# Patient Record
Sex: FEMALE | Race: WHITE | NOT HISPANIC OR LATINO | Employment: OTHER | RURAL
[De-identification: names, ages, dates, MRNs, and addresses within clinical notes are randomized per-mention and may not be internally consistent; named-entity substitution may affect disease eponyms.]

---

## 2021-09-02 ENCOUNTER — OFFICE VISIT (OUTPATIENT)
Dept: OBSTETRICS AND GYNECOLOGY | Facility: CLINIC | Age: 61
End: 2021-09-02
Payer: COMMERCIAL

## 2021-09-02 VITALS
SYSTOLIC BLOOD PRESSURE: 145 MMHG | BODY MASS INDEX: 27.67 KG/M2 | WEIGHT: 172.19 LBS | HEIGHT: 66 IN | DIASTOLIC BLOOD PRESSURE: 78 MMHG

## 2021-09-02 DIAGNOSIS — N39.3 STRESS INCONTINENCE OF URINE: ICD-10-CM

## 2021-09-02 DIAGNOSIS — Z12.4 SCREENING FOR MALIGNANT NEOPLASM OF THE CERVIX: ICD-10-CM

## 2021-09-02 DIAGNOSIS — Z01.419 WOMEN'S ANNUAL ROUTINE GYNECOLOGICAL EXAMINATION: Primary | ICD-10-CM

## 2021-09-02 PROCEDURE — 99203 OFFICE O/P NEW LOW 30 MIN: CPT | Mod: PBBFAC | Performed by: OBSTETRICS & GYNECOLOGY

## 2021-09-02 PROCEDURE — 87086 URINE CULTURE/COLONY COUNT: CPT | Mod: ,,, | Performed by: CLINICAL MEDICAL LABORATORY

## 2021-09-02 PROCEDURE — 99386 PR PREVENTIVE VISIT,NEW,40-64: ICD-10-PCS | Mod: S$PBB,,, | Performed by: OBSTETRICS & GYNECOLOGY

## 2021-09-02 PROCEDURE — 88142 CYTOPATH C/V THIN LAYER: CPT | Mod: GCY | Performed by: OBSTETRICS & GYNECOLOGY

## 2021-09-02 PROCEDURE — 87086 CULTURE, URINE: ICD-10-PCS | Mod: ,,, | Performed by: CLINICAL MEDICAL LABORATORY

## 2021-09-02 PROCEDURE — 87077 CULTURE, URINE: ICD-10-PCS | Mod: ,,, | Performed by: CLINICAL MEDICAL LABORATORY

## 2021-09-02 PROCEDURE — 87077 CULTURE AEROBIC IDENTIFY: CPT | Mod: ,,, | Performed by: CLINICAL MEDICAL LABORATORY

## 2021-09-02 PROCEDURE — 99386 PREV VISIT NEW AGE 40-64: CPT | Mod: S$PBB,,, | Performed by: OBSTETRICS & GYNECOLOGY

## 2021-09-02 RX ORDER — VALACYCLOVIR HYDROCHLORIDE 1 G/1
1000 TABLET, FILM COATED ORAL DAILY
COMMUNITY
Start: 2021-08-27

## 2021-09-04 LAB — UA COMPLETE W REFLEX CULTURE PNL UR: ABNORMAL

## 2021-09-09 LAB
GH SERPL-MCNC: NORMAL NG/ML
INSULIN SERPL-ACNC: NORMAL U[IU]/ML
LAB AP CLINICAL INFORMATION: NORMAL
LAB AP GYN INTERPRETATION: NEGATIVE
LAB AP PAP DISCLAIMER COMMENTS: NORMAL
RENIN PLAS-CCNC: NORMAL NG/ML/H

## 2023-09-02 ENCOUNTER — OFFICE VISIT (OUTPATIENT)
Dept: FAMILY MEDICINE | Facility: CLINIC | Age: 63
End: 2023-09-02
Payer: COMMERCIAL

## 2023-09-02 VITALS
BODY MASS INDEX: 27.64 KG/M2 | RESPIRATION RATE: 20 BRPM | DIASTOLIC BLOOD PRESSURE: 76 MMHG | WEIGHT: 172 LBS | TEMPERATURE: 99 F | HEIGHT: 66 IN | HEART RATE: 56 BPM | OXYGEN SATURATION: 97 % | SYSTOLIC BLOOD PRESSURE: 128 MMHG
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
## 2023-09-02 DIAGNOSIS — S81.851A CAT BITE OF RIGHT LOWER LEG, INITIAL ENCOUNTER: Primary | ICD-10-CM

## 2023-09-02 PROCEDURE — 99203 OFFICE O/P NEW LOW 30 MIN: CPT | Mod: ,,, | Performed by: FAMILY MEDICINE

## 2023-09-02 PROCEDURE — 99051 MED SERV EVE/WKEND/HOLIDAY: CPT | Mod: ,,, | Performed by: FAMILY MEDICINE

## 2023-09-02 PROCEDURE — 99203 PR OFFICE/OUTPT VISIT, NEW, LEVL III, 30-44 MIN: ICD-10-PCS | Mod: ,,, | Performed by: FAMILY MEDICINE

## 2023-09-02 PROCEDURE — 99051 PR MEDICAL SERVICES, EVE/WKEND/HOLIDAY: ICD-10-PCS | Mod: ,,, | Performed by: FAMILY MEDICINE

## 2023-09-02 RX ORDER — AMOXICILLIN AND CLAVULANATE POTASSIUM 875; 125 MG/1; MG/1
1 TABLET, FILM COATED ORAL EVERY 12 HOURS
Qty: 20 TABLET | Refills: 0 | Status: SHIPPED | OUTPATIENT
Start: 2023-09-02 | End: 2023-09-30

## 2023-09-02 NOTE — PROGRESS NOTES
Subjective     Patient ID: Dorie Rhoades is a 62 y.o. female.    Chief Complaint: Leg Swelling (Right lower leg is swelling from cat scratching yesterday)    Patient had temp up to 100° last night.    Review of Systems       Objective     Physical Exam  Constitutional:       General: She is in acute distress (she appears uncomfortable).      Appearance: She is not ill-appearing.   Cardiovascular:      Rate and Rhythm: Normal rate and regular rhythm.   Pulmonary:      Effort: Pulmonary effort is normal.      Breath sounds: Normal breath sounds.   Skin:     Findings: Erythema (patient has a wound in right lower leg more consistent with a bite than a scratch.  She has very mild diffuse erythema and edema in the area.  Right lower leg just proximal to ankle) and rash present.   Neurological:      Mental Status: She is alert.          Assessment and Plan     1. Cat bite of right lower leg, initial encounter    Other orders  -     amoxicillin-clavulanate 875-125mg (AUGMENTIN) 875-125 mg per tablet; Take 1 tablet by mouth every 12 (twelve) hours.  Dispense: 20 tablet; Refill: 0        Call for worsening swelling redness or fever         No follow-ups on file.

## 2023-09-29 ENCOUNTER — OFFICE VISIT (OUTPATIENT)
Dept: FAMILY MEDICINE | Facility: CLINIC | Age: 63
End: 2023-09-29
Payer: COMMERCIAL

## 2023-09-29 VITALS
HEIGHT: 66 IN | WEIGHT: 167 LBS | DIASTOLIC BLOOD PRESSURE: 80 MMHG | SYSTOLIC BLOOD PRESSURE: 148 MMHG | OXYGEN SATURATION: 92 % | BODY MASS INDEX: 26.84 KG/M2 | RESPIRATION RATE: 20 BRPM | HEART RATE: 77 BPM

## 2023-09-29 DIAGNOSIS — R94.31 EKG ABNORMALITY: Primary | ICD-10-CM

## 2023-09-29 DIAGNOSIS — D89.2 HYPERGAMMAGLOBULINEMIA: ICD-10-CM

## 2023-09-29 LAB
ALBUMIN SERPL BCP-MCNC: 3.8 G/DL (ref 3.5–5)
ALBUMIN/GLOB SERPL: 1.1 {RATIO}
ALP SERPL-CCNC: 104 U/L (ref 50–130)
ALT SERPL W P-5'-P-CCNC: 39 U/L (ref 13–56)
ANION GAP SERPL CALCULATED.3IONS-SCNC: 11 MMOL/L (ref 7–16)
AST SERPL W P-5'-P-CCNC: 20 U/L (ref 15–37)
BILIRUB SERPL-MCNC: 0.4 MG/DL (ref ?–1.2)
BUN SERPL-MCNC: 22 MG/DL (ref 7–18)
BUN/CREAT SERPL: 25 (ref 6–20)
CALCIUM SERPL-MCNC: 9.2 MG/DL (ref 8.5–10.1)
CHLORIDE SERPL-SCNC: 104 MMOL/L (ref 98–107)
CO2 SERPL-SCNC: 32 MMOL/L (ref 21–32)
CREAT SERPL-MCNC: 0.87 MG/DL (ref 0.55–1.02)
EGFR (NO RACE VARIABLE) (RUSH/TITUS): 75 ML/MIN/1.73M2
GLOBULIN SER-MCNC: 3.4 G/DL (ref 2–4)
GLUCOSE SERPL-MCNC: 100 MG/DL (ref 74–106)
POTASSIUM SERPL-SCNC: 4.7 MMOL/L (ref 3.5–5.1)
PROT SERPL-MCNC: 7.2 G/DL (ref 6.4–8.2)
SODIUM SERPL-SCNC: 142 MMOL/L (ref 136–145)

## 2023-09-29 PROCEDURE — 84165 PROTEIN ELECTROPHORESIS, SERUM WITH REFLEX IFE: ICD-10-PCS | Mod: 26,,, | Performed by: PATHOLOGY

## 2023-09-29 PROCEDURE — 99213 OFFICE O/P EST LOW 20 MIN: CPT | Mod: ,,, | Performed by: FAMILY MEDICINE

## 2023-09-29 PROCEDURE — 84165 PROTEIN ELECTROPHORESIS, SERUM WITH REFLEX IFE: ICD-10-PCS | Mod: ,,, | Performed by: CLINICAL MEDICAL LABORATORY

## 2023-09-29 PROCEDURE — 84165 PROTEIN E-PHORESIS SERUM: CPT | Mod: ,,, | Performed by: CLINICAL MEDICAL LABORATORY

## 2023-09-29 PROCEDURE — 80053 COMPREHENSIVE METABOLIC PANEL: ICD-10-PCS | Mod: ,,, | Performed by: CLINICAL MEDICAL LABORATORY

## 2023-09-29 PROCEDURE — 84165 PROTEIN E-PHORESIS SERUM: CPT | Mod: 26,,, | Performed by: PATHOLOGY

## 2023-09-29 PROCEDURE — 99213 PR OFFICE/OUTPT VISIT, EST, LEVL III, 20-29 MIN: ICD-10-PCS | Mod: ,,, | Performed by: FAMILY MEDICINE

## 2023-09-29 PROCEDURE — 80053 COMPREHEN METABOLIC PANEL: CPT | Mod: ,,, | Performed by: CLINICAL MEDICAL LABORATORY

## 2023-09-29 RX ORDER — AMOXICILLIN AND CLAVULANATE POTASSIUM 875; 125 MG/1; MG/1
1 TABLET, FILM COATED ORAL EVERY 12 HOURS
Qty: 20 TABLET | Refills: 0 | Status: SHIPPED | OUTPATIENT
Start: 2023-09-29 | End: 2023-11-09

## 2023-09-29 RX ORDER — FLUTICASONE PROPIONATE 50 MCG
2 SPRAY, SUSPENSION (ML) NASAL 2 TIMES DAILY
Qty: 18.2 ML | Refills: 0 | Status: SHIPPED | OUTPATIENT
Start: 2023-09-29

## 2023-09-30 NOTE — PROGRESS NOTES
Grady Feliz MD        PATIENT NAME: Dorie Rhoades  : 1960  DATE: 23  MRN: [id number]      Billing Provider: Grady Feliz MD  Level of Service: [place] OFFICE/OUTPT VISIT, EST, LEVL III, 20-29 MIN  Patient PCP Information       Provider PCP Type    Grady Feliz MD General            Reason for Visit / Chief Complaint: Sinus Problem       Update PCP  Update Chief Complaint         History of Present Illness / Problem Focused Workflow     Dorie Rhoades presents to the clinic with Sinus Problem     Patient recently had a physical exam and lab with the ekg to upgrade her life insurance.  She was denied and has come for clarification.  The issues were hyper gammaglobulin  and an  abnormal EKG.  sHe is totally asymptomatic    Sinus Problem  Pertinent negatives include no congestion, coughing, headaches, neck pain, sinus pressure or sore throat.       Review of Systems     Review of Systems   Constitutional:  Negative for activity change, appetite change, fever and unexpected weight change.   HENT:  Negative for congestion, rhinorrhea, sinus pressure, sinus pain, sore throat and trouble swallowing.    Eyes:  Negative for photophobia, pain, discharge and visual disturbance.   Respiratory:  Negative for cough, chest tightness, wheezing and stridor.    Cardiovascular:  Negative for chest pain, palpitations and leg swelling.   Gastrointestinal:  Negative for abdominal pain, blood in stool, constipation, diarrhea and nausea.   Endocrine: Negative for polydipsia, polyphagia and polyuria.   Genitourinary:  Negative for difficulty urinating, flank pain and hematuria.   Musculoskeletal:  Negative for arthralgias and neck pain.   Skin:  Negative for rash.   Allergic/Immunologic: Negative for food allergies.   Neurological:  Negative for dizziness, tremors, seizures, syncope, weakness (global weakness) and headaches.   Psychiatric/Behavioral:  Negative for behavioral problems, confusion, decreased  concentration, dysphoric mood and hallucinations. The patient is not nervous/anxious.         Medical / Social / Family History   History reviewed. No pertinent past medical history.    Past Surgical History:   Procedure Laterality Date     SECTION      UTERINE FIBROID SURGERY         Social History  Ms.  reports that she has never smoked. She has never been exposed to tobacco smoke. She has never used smokeless tobacco. She reports that she does not currently use alcohol. She reports that she does not use drugs.    Family History  Ms.'s family history is not on file.    Medications and Allergies     Medications  No outpatient medications have been marked as taking for the 23 encounter (Office Visit) with Grady Feliz MD.       Allergies  Review of patient's allergies indicates:  No Known Allergies    Physical Examination     Vitals:    23 1157   BP: (!) 148/80   Pulse:    Resp:      Physical Exam  Constitutional:       General: She is not in acute distress.     Appearance: Normal appearance.   HENT:      Head: Normocephalic.      Right Ear: Tympanic membrane and ear canal normal.      Left Ear: Tympanic membrane and ear canal normal.      Nose: Nose normal.      Mouth/Throat:      Mouth: Mucous membranes are moist.      Pharynx: No oropharyngeal exudate.   Eyes:      Extraocular Movements: Extraocular movements intact.      Pupils: Pupils are equal, round, and reactive to light.   Cardiovascular:      Rate and Rhythm: Normal rate and regular rhythm.      Heart sounds: No murmur heard.  Pulmonary:      Effort: Pulmonary effort is normal.      Breath sounds: Normal breath sounds. No wheezing.   Abdominal:      General: Abdomen is flat. Bowel sounds are normal.      Palpations: Abdomen is soft.      Hernia: No hernia is present.   Musculoskeletal:         General: Normal range of motion.      Cervical back: Normal range of motion and neck supple.      Right lower leg: No edema.      Left lower  leg: No edema.   Lymphadenopathy:      Cervical: No cervical adenopathy.   Skin:     General: Skin is warm and dry.      Coloration: Skin is not jaundiced.      Findings: No lesion.   Neurological:      General: No focal deficit present.      Mental Status: She is alert and oriented to person, place, and time.      Cranial Nerves: No cranial nerve deficit.      Gait: Gait normal.   Psychiatric:         Mood and Affect: Mood normal.         Behavior: Behavior normal.         Judgment: Judgment normal.          Assessment and Plan (including Health Maintenance)      Problem List  Smart Sets  Document Outside HM   :    Plan:     1. EKG abnormality    -     Ambulatory referral/consult to Cardiology; Future; Expected date: 10/06/2023    2. Hypergammaglobulinemia    -     Comprehensive Metabolic Panel; Future; Expected date: 03/29/2024  -     Protein Electrophoresis, Serum with Reflex LUCRECIA; Future; Expected date: 09/29/2023    Other orders  -     amoxicillin-clavulanate 875-125mg (AUGMENTIN) 875-125 mg per tablet; Take 1 tablet by mouth every 12 (twelve) hours.  Dispense: 20 tablet; Refill: 0  -     fluticasone propionate (FLONASE) 50 mcg/actuation nasal spray; 2 sprays (100 mcg total) by Each Nostril route 2 (two) times a day.  Dispense: 18.2 mL; Refill: 0          Health Maintenance Due   Topic Date Due    Hepatitis C Screening  Never done    Lipid Panel  Never done    COVID-19 Vaccine (1) Never done    HIV Screening  Never done    TETANUS VACCINE  Never done    Mammogram  Never done    Hemoglobin A1c (Diabetic Prevention Screening)  Never done    Colorectal Cancer Screening  Never done    Shingles Vaccine (1 of 2) Never done    Influenza Vaccine (1) Never done       Problem List Items Addressed This Visit    None  Visit Diagnoses       EKG abnormality    -  Primary    Relevant Orders    Ambulatory referral/consult to Cardiology    Hypergammaglobulinemia        Relevant Orders    Comprehensive Metabolic Panel (Completed)     Protein Electrophoresis, Serum with Reflex LUCRECIA            Health Maintenance Topics with due status: Not Due       Topic Last Completion Date    Cervical Cancer Screening 09/02/2021     She does have a bundle-branch block on EKG.  Will refer to Cardiology.  Other labs pending I will contact her with the results.  No future appointments.     There are no Patient Instructions on file for this visit.  Follow up if symptoms worsen or fail to improve.     Signature:  Grady Feliz MD      Date of encounter: 9/29/23

## 2023-10-03 LAB
ALBUMIN PE, BLOOD: 4.63 G/DL (ref 3.5–5.2)
ALPHA1 GLOB SERPL ELPH-MCNC: 0.2 G/DL (ref 0.1–0.4)
ALPHA2 GLOB SERPL ELPH-MCNC: 0.7 G/DL (ref 0.4–1.3)
B-GLOBULIN SERPL ELPH-MCNC: 0.7 G/DL (ref 0.5–1.5)
GAMMA GLOB SERPL ELPH-MCNC: 0.7 G/DL (ref 0.5–1.8)
PATH INTERP BLD-IMP: NORMAL
PROT SERPL-MCNC: 6.9 G/DL (ref 6.4–8.2)

## 2023-11-08 NOTE — PROGRESS NOTES
"PCP: Grady Feliz MD    Referring Provider:     Subjective:   Dorie Rhoades is a 62 y.o. female with of cardiovascular disease who presents for a new patient visit.    Referred for abnormal EKG. She underwent EKG for insurance evaluation. EKG 7/12/23 reviewed- NSR with nonspecific ST abnormality. Due to above abnormality, she is referred to cardiology for further evaluation today.  EKG today with possible LVH and nonspecific ST abnormality.   Denies chest pain/tightness, dyspnea, palpitations, lightheadedness or syncope.     Fhx:  Diabetes (mother, father, sister)  Shx:  Never smoker    EKG 11/09/2023:  Normal sinus rhythm, LVH by voltage criteria, nonspecific ST abnormality    ECHO No results found for this or any previous visit.     CATH: No results found for this or any previous visit.     Lab Results   Component Value Date     09/29/2023    K 4.7 09/29/2023     09/29/2023    CO2 32 09/29/2023    BUN 22 (H) 09/29/2023    CREATININE 0.87 09/29/2023    CALCIUM 9.2 09/29/2023    ANIONGAP 11 09/29/2023       No results found for: "CHOL"  No results found for: "HDL"  No results found for: "LDLCALC"  No results found for: "TRIG"  No results found for: "CHOLHDL"    No results found for: "WBC", "HGB", "HCT", "MCV", "PLT"        Current Outpatient Medications:     aspirin 325 MG tablet, Take 325 mg by mouth once daily., Disp: , Rfl:     fluticasone propionate (FLONASE) 50 mcg/actuation nasal spray, 2 sprays (100 mcg total) by Each Nostril route 2 (two) times a day., Disp: 18.2 mL, Rfl: 0    valACYclovir (VALTREX) 1000 MG tablet, Take 1,000 mg by mouth once daily., Disp: , Rfl:     amoxicillin-clavulanate 875-125mg (AUGMENTIN) 875-125 mg per tablet, Take 1 tablet by mouth every 12 (twelve) hours. (Patient not taking: Reported on 11/9/2023), Disp: 20 tablet, Rfl: 0    losartan (COZAAR) 25 MG tablet, Take 1 tablet (25 mg total) by mouth once daily., Disp: 90 tablet, Rfl: 3    Review of Systems " "  Constitutional:  Negative for chills, diaphoresis, fever and malaise/fatigue.   Respiratory:  Negative for cough and shortness of breath.    Cardiovascular:  Negative for chest pain, palpitations, orthopnea, claudication, leg swelling and PND.   Gastrointestinal:  Negative for abdominal pain, heartburn, nausea and vomiting.   Neurological:  Negative for dizziness.       Objective:   /88 (BP Location: Right arm, Patient Position: Sitting)   Pulse 73   Ht 5' 6" (1.676 m)   Wt 75.8 kg (167 lb)   SpO2 96%   BMI 26.95 kg/m²     Physical Exam  Constitutional:       General: She is not in acute distress.     Appearance: Normal appearance.   Cardiovascular:      Rate and Rhythm: Normal rate and regular rhythm.      Pulses: Normal pulses.      Heart sounds: Normal heart sounds. No murmur heard.     No friction rub. No gallop.   Pulmonary:      Effort: Pulmonary effort is normal.      Breath sounds: Normal breath sounds. No wheezing or rales.   Musculoskeletal:      Right lower leg: No edema.      Left lower leg: No edema.   Skin:     General: Skin is warm and dry.   Neurological:      Mental Status: She is alert.           Assessment:     1. Nonspecific abnormal electrocardiogram (ECG) (EKG)  EKG 12-lead    EKG 12-lead      2. EKG abnormality  Ambulatory referral/consult to Cardiology            Plan:   No problem-specific Assessment & Plan notes found for this encounter.    Hypertension  Blood pressure 150/90 on initial presentation, but 130/88 on recheck  Based on review of prior blood pressure readings, 4 out of 5 BP readings consistent with hypertension  EKG with possible LVH    Plan:  Start losartan 25 mg daily    Nonspecific ST abnormality on EKG   Pt is asymptomatic   Denies anginal or anginal equivalent symptoms and is reasonably active at baseline.  Walks 1-1.5 miles per day without any significant complaints.  No further cardiac testing for now    Follow-up in 6 months  "

## 2023-11-09 ENCOUNTER — OFFICE VISIT (OUTPATIENT)
Dept: CARDIOLOGY | Facility: CLINIC | Age: 63
End: 2023-11-09
Payer: COMMERCIAL

## 2023-11-09 VITALS
BODY MASS INDEX: 26.84 KG/M2 | WEIGHT: 167 LBS | DIASTOLIC BLOOD PRESSURE: 88 MMHG | HEART RATE: 73 BPM | SYSTOLIC BLOOD PRESSURE: 130 MMHG | HEIGHT: 66 IN | OXYGEN SATURATION: 96 %

## 2023-11-09 DIAGNOSIS — R94.31 NONSPECIFIC ABNORMAL ELECTROCARDIOGRAM (ECG) (EKG): Primary | ICD-10-CM

## 2023-11-09 DIAGNOSIS — R94.31 EKG ABNORMALITY: ICD-10-CM

## 2023-11-09 PROCEDURE — 93010 EKG 12-LEAD: ICD-10-PCS | Mod: S$PBB,,, | Performed by: STUDENT IN AN ORGANIZED HEALTH CARE EDUCATION/TRAINING PROGRAM

## 2023-11-09 PROCEDURE — 99214 OFFICE O/P EST MOD 30 MIN: CPT | Mod: PBBFAC | Performed by: STUDENT IN AN ORGANIZED HEALTH CARE EDUCATION/TRAINING PROGRAM

## 2023-11-09 PROCEDURE — 99204 PR OFFICE/OUTPT VISIT, NEW, LEVL IV, 45-59 MIN: ICD-10-PCS | Mod: S$PBB,,, | Performed by: STUDENT IN AN ORGANIZED HEALTH CARE EDUCATION/TRAINING PROGRAM

## 2023-11-09 PROCEDURE — 99204 OFFICE O/P NEW MOD 45 MIN: CPT | Mod: S$PBB,,, | Performed by: STUDENT IN AN ORGANIZED HEALTH CARE EDUCATION/TRAINING PROGRAM

## 2023-11-09 PROCEDURE — 93005 ELECTROCARDIOGRAM TRACING: CPT | Mod: PBBFAC | Performed by: STUDENT IN AN ORGANIZED HEALTH CARE EDUCATION/TRAINING PROGRAM

## 2023-11-09 PROCEDURE — 93010 ELECTROCARDIOGRAM REPORT: CPT | Mod: S$PBB,,, | Performed by: STUDENT IN AN ORGANIZED HEALTH CARE EDUCATION/TRAINING PROGRAM

## 2023-11-09 RX ORDER — LOSARTAN POTASSIUM 25 MG/1
25 TABLET ORAL DAILY
Qty: 90 TABLET | Refills: 3 | Status: SHIPPED | OUTPATIENT
Start: 2023-11-09 | End: 2024-11-08

## 2023-11-09 RX ORDER — ASPIRIN 325 MG
325 TABLET ORAL DAILY
COMMUNITY

## 2024-05-12 NOTE — PROGRESS NOTES
"PCP: Grady Feliz MD    Referring Provider:     Subjective:   Dorie Rhoades is a 63 y.o. female with of cardiovascular disease who presents for a follow up visit.    5/13/24:  Doing well.  Complains of occasional shortness of breath when she is out in the heat.  She continues to walk and dance without any major complaints.  Chest pain, palpitations lightheadedness or syncope.    11/08/23:  Presented for new patient visit.  Referred for abnormal EKG. She underwent EKG for insurance evaluation. EKG 7/12/23 reviewed- NSR with nonspecific ST abnormality. Due to above abnormality, she is referred to cardiology for further evaluation today.  EKG today with possible LVH and nonspecific ST abnormality.   Denies chest pain/tightness, dyspnea, palpitations, lightheadedness or syncope.     Fhx:  Diabetes (mother, father, sister)  Shx:  Never smoker    EKG 05/14/2024: Normal sinus rhythm, anterior infarct age undetermined  11/09/2023:  Normal sinus rhythm, LVH by voltage criteria, nonspecific ST abnormality    ECHO No results found for this or any previous visit.     CATH: No results found for this or any previous visit.     Lab Results   Component Value Date     09/29/2023    K 4.7 09/29/2023     09/29/2023    CO2 32 09/29/2023    BUN 22 (H) 09/29/2023    CREATININE 0.87 09/29/2023    CALCIUM 9.2 09/29/2023    ANIONGAP 11 09/29/2023       No results found for: "CHOL"  No results found for: "HDL"  No results found for: "LDLCALC"  No results found for: "TRIG"  No results found for: "CHOLHDL"    No results found for: "WBC", "HGB", "HCT", "MCV", "PLT"        Current Outpatient Medications:     aspirin 325 MG tablet, Take 325 mg by mouth once daily., Disp: , Rfl:     cetirizine (ZYRTEC) 10 MG tablet, Take 10 mg by mouth once daily., Disp: , Rfl:     fluticasone propionate (FLONASE) 50 mcg/actuation nasal spray, 2 sprays (100 mcg total) by Each Nostril route 2 (two) times a day., Disp: 18.2 mL, Rfl: 0    folic acid " "(FOLVITE) 1 MG tablet, Take 1 mg by mouth once daily., Disp: , Rfl:     losartan (COZAAR) 25 MG tablet, Take 1 tablet (25 mg total) by mouth once daily., Disp: 90 tablet, Rfl: 3    multivit,stress formula-zinc (STRESS FORMULA WITH ZINC) Tab, Take by mouth., Disp: , Rfl:     omega-3 fatty acids/fish oil (FISH OIL-OMEGA-3 FATTY ACIDS) 300-1,000 mg capsule, Take 1 capsule by mouth once daily., Disp: , Rfl:     pyridoxine, vitamin B6, (VITAMIN B-6) 100 MG Tab, Take 50 mg by mouth once daily., Disp: , Rfl:     valACYclovir (VALTREX) 1000 MG tablet, Take 1,000 mg by mouth once daily., Disp: , Rfl:     vitamin E 400 UNIT capsule, Take 400 Units by mouth once daily., Disp: , Rfl:     zinc gluconate 50 mg tablet, Take 50 mg by mouth once daily., Disp: , Rfl:     Review of Systems   Constitutional:  Negative for chills, diaphoresis, fever and malaise/fatigue.   Respiratory:  Negative for cough and shortness of breath.    Cardiovascular:  Negative for chest pain, palpitations, orthopnea, claudication, leg swelling and PND.   Gastrointestinal:  Negative for abdominal pain, heartburn, nausea and vomiting.   Neurological:  Negative for dizziness.       Objective:   /60 (BP Location: Left arm, Patient Position: Sitting)   Pulse 76   Ht 5' 6" (1.676 m)   Wt 76.9 kg (169 lb 9.6 oz)   SpO2 96%   BMI 27.37 kg/m²     Physical Exam  Constitutional:       General: She is not in acute distress.     Appearance: Normal appearance.   Cardiovascular:      Rate and Rhythm: Normal rate and regular rhythm.      Pulses: Normal pulses.      Heart sounds: Normal heart sounds. No murmur heard.     No friction rub. No gallop.   Pulmonary:      Effort: Pulmonary effort is normal.      Breath sounds: Normal breath sounds. No wheezing or rales.   Musculoskeletal:      Right lower leg: No edema.      Left lower leg: No edema.   Skin:     General: Skin is warm and dry.   Neurological:      Mental Status: She is alert.           Assessment: "     1. Hypertension, unspecified type  EKG 12-lead            Plan:   No problem-specific Assessment & Plan notes found for this encounter.    Hypertension  11/2023:Blood pressure 150/90 on initial presentation, but 130/88 on recheck  Based on review of prior blood pressure readings, 4 out of 5 BP readings consistent with hypertension. EKG with possible LVH  BP is at goal today    Plan:  Continue losartan 25 mg daily    Abnormal EKG   Nonspecific ST abnormality  Age indeterminate anterior infarct  Denies angina and is reasonably active at baseline.  Walks 1-1.5 miles per day without any significant complaints.  No further cardiac testing for now  Endorses shortness of breath when out in the heat.  If this persists, schedule echocardiogram    Follow-up in 6 months

## 2024-05-13 ENCOUNTER — OFFICE VISIT (OUTPATIENT)
Dept: CARDIOLOGY | Facility: CLINIC | Age: 64
End: 2024-05-13
Payer: COMMERCIAL

## 2024-05-13 VITALS
BODY MASS INDEX: 27.26 KG/M2 | OXYGEN SATURATION: 96 % | SYSTOLIC BLOOD PRESSURE: 130 MMHG | HEART RATE: 76 BPM | DIASTOLIC BLOOD PRESSURE: 60 MMHG | HEIGHT: 66 IN | WEIGHT: 169.63 LBS

## 2024-05-13 DIAGNOSIS — R06.09 DYSPNEA ON EXERTION: Primary | ICD-10-CM

## 2024-05-13 DIAGNOSIS — I10 HYPERTENSION, UNSPECIFIED TYPE: ICD-10-CM

## 2024-05-13 DIAGNOSIS — R94.31 NONSPECIFIC ABNORMAL ELECTROCARDIOGRAM (ECG) (EKG): ICD-10-CM

## 2024-05-13 LAB
OHS QRS DURATION: 86 MS
OHS QTC CALCULATION: 425 MS

## 2024-05-13 PROCEDURE — 93010 ELECTROCARDIOGRAM REPORT: CPT | Mod: S$PBB,,, | Performed by: STUDENT IN AN ORGANIZED HEALTH CARE EDUCATION/TRAINING PROGRAM

## 2024-05-13 PROCEDURE — 99213 OFFICE O/P EST LOW 20 MIN: CPT | Mod: S$PBB,,, | Performed by: STUDENT IN AN ORGANIZED HEALTH CARE EDUCATION/TRAINING PROGRAM

## 2024-05-13 PROCEDURE — 99214 OFFICE O/P EST MOD 30 MIN: CPT | Mod: PBBFAC,25 | Performed by: STUDENT IN AN ORGANIZED HEALTH CARE EDUCATION/TRAINING PROGRAM

## 2024-05-13 PROCEDURE — 93005 ELECTROCARDIOGRAM TRACING: CPT | Mod: PBBFAC | Performed by: STUDENT IN AN ORGANIZED HEALTH CARE EDUCATION/TRAINING PROGRAM

## 2024-05-13 RX ORDER — CETIRIZINE HYDROCHLORIDE 10 MG/1
10 TABLET ORAL DAILY
COMMUNITY

## 2024-05-13 RX ORDER — FOLIC ACID 1 MG/1
1 TABLET ORAL DAILY
COMMUNITY

## 2024-05-13 RX ORDER — PYRIDOXINE HCL (VITAMIN B6) 100 MG
50 TABLET ORAL DAILY
COMMUNITY

## 2024-05-13 RX ORDER — AMOXICILLIN 500 MG
1 CAPSULE ORAL DAILY
COMMUNITY

## 2024-05-13 RX ORDER — MULTIVIT,STRESS FORMULA/ZINC
TABLET ORAL
COMMUNITY

## 2024-05-13 RX ORDER — ZINC GLUCONATE 50 MG
50 TABLET ORAL DAILY
COMMUNITY

## 2024-05-13 RX ORDER — VITAMIN E 268 MG
400 CAPSULE ORAL DAILY
COMMUNITY

## 2024-07-30 ENCOUNTER — OFFICE VISIT (OUTPATIENT)
Dept: FAMILY MEDICINE | Facility: CLINIC | Age: 64
End: 2024-07-30
Payer: COMMERCIAL

## 2024-07-30 VITALS
DIASTOLIC BLOOD PRESSURE: 80 MMHG | WEIGHT: 172 LBS | HEIGHT: 66 IN | SYSTOLIC BLOOD PRESSURE: 131 MMHG | RESPIRATION RATE: 20 BRPM | HEART RATE: 70 BPM | BODY MASS INDEX: 27.64 KG/M2 | OXYGEN SATURATION: 95 %

## 2024-07-30 DIAGNOSIS — L70.9 ACNE, UNSPECIFIED ACNE TYPE: ICD-10-CM

## 2024-07-30 DIAGNOSIS — D22.9 ATYPICAL NEVUS: Primary | ICD-10-CM

## 2024-07-30 PROCEDURE — 99212 OFFICE O/P EST SF 10 MIN: CPT | Mod: ,,, | Performed by: FAMILY MEDICINE

## 2024-07-30 RX ORDER — TRETINOIN 0.5 MG/G
CREAM TOPICAL NIGHTLY
Qty: 20 G | Refills: 5 | Status: SHIPPED | OUTPATIENT
Start: 2024-07-30

## 2024-07-30 RX ORDER — MUPIROCIN 20 MG/G
OINTMENT TOPICAL 3 TIMES DAILY
Qty: 15 G | Refills: 5 | Status: SHIPPED | OUTPATIENT
Start: 2024-07-30

## 2024-07-30 NOTE — PROGRESS NOTES
Grady Feliz MD        PATIENT NAME: Dorie Rhoades  : 1960  DATE: 24  MRN: [id number]      Billing Provider: Grady Feliz MD  Level of Service: [place] OFFICE/OUTPT VISIT, EST, LEVL II, 10-19 MIN  Patient PCP Information       Provider PCP Type    Grady Feliz MD General            Reason for Visit / Chief Complaint: Rash (Mole on chest)       Update PCP  Update Chief Complaint         History of Present Illness / Problem Focused Workflow     Dorie Rhoades presents to the clinic with Rash (Mole on chest)     Dark mole on chest.    Rash  Pertinent negatives include no congestion, cough, diarrhea, eye pain, fever, rhinorrhea or sore throat.       Review of Systems     Review of Systems   Constitutional:  Negative for activity change, appetite change, fever and unexpected weight change.   HENT:  Negative for congestion, rhinorrhea, sinus pressure, sinus pain, sore throat and trouble swallowing.    Eyes:  Negative for photophobia, pain, discharge and visual disturbance.   Respiratory:  Negative for cough, chest tightness, wheezing and stridor.    Cardiovascular:  Negative for chest pain, palpitations and leg swelling.   Gastrointestinal:  Negative for abdominal pain, blood in stool, constipation, diarrhea and nausea.   Endocrine: Negative for polydipsia, polyphagia and polyuria.   Genitourinary:  Negative for difficulty urinating, flank pain and hematuria.   Musculoskeletal:  Negative for arthralgias and neck pain.   Skin:  Positive for rash.   Allergic/Immunologic: Negative for food allergies.   Neurological:  Negative for dizziness, tremors, seizures, syncope, weakness (global weakness) and headaches.   Psychiatric/Behavioral:  Negative for behavioral problems, confusion, decreased concentration, dysphoric mood and hallucinations. The patient is not nervous/anxious.         Medical / Social / Family History   History reviewed. No pertinent past medical history.    Past Surgical History:    Procedure Laterality Date     SECTION      UTERINE FIBROID SURGERY         Social History  Ms.  reports that she has never smoked. She has never been exposed to tobacco smoke. She has never used smokeless tobacco. She reports that she does not currently use alcohol. She reports that she does not use drugs.    Family History  Ms.'s family history includes Diabetes in her father, mother, and sister.    Medications and Allergies     Medications  No outpatient medications have been marked as taking for the 24 encounter (Office Visit) with Grady Feliz MD.       Allergies  Review of patient's allergies indicates:  No Known Allergies    Physical Examination     Vitals:    24 1530   BP: 131/80   Pulse: 70   Resp: 20     Physical Exam  Constitutional:       General: She is not in acute distress.     Appearance: Normal appearance.   HENT:      Head: Normocephalic.      Right Ear: Tympanic membrane and ear canal normal.      Left Ear: Tympanic membrane and ear canal normal.      Nose: Nose normal.      Mouth/Throat:      Mouth: Mucous membranes are moist.      Pharynx: No oropharyngeal exudate.   Eyes:      Extraocular Movements: Extraocular movements intact.      Pupils: Pupils are equal, round, and reactive to light.   Cardiovascular:      Rate and Rhythm: Normal rate and regular rhythm.      Heart sounds: No murmur heard.  Pulmonary:      Effort: Pulmonary effort is normal.      Breath sounds: Normal breath sounds. No wheezing.   Abdominal:      General: Abdomen is flat. Bowel sounds are normal.      Palpations: Abdomen is soft.      Hernia: No hernia is present.   Musculoskeletal:         General: Normal range of motion.      Cervical back: Normal range of motion and neck supple.      Right lower leg: No edema.      Left lower leg: No edema.   Lymphadenopathy:      Cervical: No cervical adenopathy.   Skin:     General: Skin is warm and dry.      Coloration: Skin is not jaundiced.      Findings:  "Lesion present.      Comments: "Black head" on chest   Neurological:      General: No focal deficit present.      Mental Status: She is alert and oriented to person, place, and time.      Cranial Nerves: No cranial nerve deficit.      Gait: Gait normal.   Psychiatric:         Mood and Affect: Mood normal.         Behavior: Behavior normal.         Judgment: Judgment normal.          Assessment and Plan (including Health Maintenance)      Problem List  Smart Sets  Document Outside HM   :    Plan:     1. Atypical nevus    -     Ambulatory referral/consult to Dermatology; Future; Expected date: 08/06/2024          Health Maintenance Due   Topic Date Due    Hepatitis C Screening  Never done    Lipid Panel  Never done    HIV Screening  Never done    TETANUS VACCINE  Never done    Mammogram  Never done    Hemoglobin A1c (Diabetic Prevention Screening)  Never done    Colorectal Cancer Screening  Never done    Shingles Vaccine (1 of 2) Never done    RSV Vaccine (Age 60+ and Pregnant patients) (1 - 1-dose 60+ series) Never done    COVID-19 Vaccine (1 - 2023-24 season) Never done    Cervical Cancer Screening  09/02/2024       1. Atypical nevus  -     Ambulatory referral/consult to Dermatology; Future; Expected date: 08/06/2024         Health Maintenance Topics with due status: Not Due       Topic Last Completion Date    Influenza Vaccine Not Due       Future Appointments   Date Time Provider Department Center   12/5/2024  2:00 PM Mitzi Lei MD [facility]   12/13/2024  9:45 AM Milton Toth MD [facility]        There are no Patient Instructions on file for this visit.  Follow up if symptoms worsen or fail to improve.     Signature:  Grady Feliz MD      Date of encounter: 7/30/24    "

## 2024-10-23 RX ORDER — LOSARTAN POTASSIUM 25 MG/1
25 TABLET ORAL
Qty: 90 TABLET | Refills: 3 | Status: SHIPPED | OUTPATIENT
Start: 2024-10-23 | End: 2024-10-24 | Stop reason: SDUPTHER

## 2024-10-24 RX ORDER — LOSARTAN POTASSIUM 25 MG/1
25 TABLET ORAL DAILY
Qty: 90 TABLET | Refills: 3 | Status: SHIPPED | OUTPATIENT
Start: 2024-10-24

## 2024-10-24 NOTE — TELEPHONE ENCOUNTER
----- Message from Med Assistant Marilyn sent at 10/24/2024 11:25 AM CDT -----  Who Called: Dorie Rhoades    Refill or New Rx:Refill  RX Name and Strength:losartan (COZAAR) 25 MG tablet  How is the patient currently taking it? (ex. 1XDay):1x  Is this a 30 day or 90 day RX:90    List of preferred pharmacies on file (remove unneeded): [facility] [address]        Preferred Method of Contact: Phone Call  Patient's Preferred Phone Number on File: [phone]   Best Call Back Number, if different:  Additional Information: refill

## 2024-12-13 ENCOUNTER — OFFICE VISIT (OUTPATIENT)
Dept: CARDIOLOGY | Facility: CLINIC | Age: 64
End: 2024-12-13
Payer: MEDICARE

## 2024-12-13 VITALS
SYSTOLIC BLOOD PRESSURE: 130 MMHG | HEART RATE: 76 BPM | WEIGHT: 174.38 LBS | DIASTOLIC BLOOD PRESSURE: 72 MMHG | BODY MASS INDEX: 28.03 KG/M2 | HEIGHT: 66 IN | OXYGEN SATURATION: 99 %

## 2024-12-13 DIAGNOSIS — I10 HYPERTENSION, UNSPECIFIED TYPE: Primary | ICD-10-CM

## 2024-12-13 DIAGNOSIS — R01.1 UNDIAGNOSED CARDIAC MURMURS: ICD-10-CM

## 2024-12-13 PROCEDURE — 99999 PR PBB SHADOW E&M-EST. PATIENT-LVL V: CPT | Mod: PBBFAC,,, | Performed by: STUDENT IN AN ORGANIZED HEALTH CARE EDUCATION/TRAINING PROGRAM

## 2024-12-13 PROCEDURE — 99214 OFFICE O/P EST MOD 30 MIN: CPT | Mod: S$PBB,,, | Performed by: STUDENT IN AN ORGANIZED HEALTH CARE EDUCATION/TRAINING PROGRAM

## 2024-12-13 PROCEDURE — 99215 OFFICE O/P EST HI 40 MIN: CPT | Mod: PBBFAC | Performed by: STUDENT IN AN ORGANIZED HEALTH CARE EDUCATION/TRAINING PROGRAM

## 2024-12-13 NOTE — PROGRESS NOTES
"PCP: Grady Feliz MD    Referring Provider:     Subjective:   Dorie Rhoades is a 64 y.o. female with of cardiovascular disease who presents for a follow up visit.    12/13/24:  Occasional dyspnea and palpitations when out in the heat.  Reports longstanding history of murmur.  No baseline echo noted on file.    5/13/24:  Doing well.  Complains of occasional shortness of breath when she is out in the heat.  She continues to walk and dance without any major complaints.  Chest pain, palpitations lightheadedness or syncope.    11/08/23:  Presented for new patient visit.  Referred for abnormal EKG. She underwent EKG for insurance evaluation. EKG 7/12/23 reviewed- NSR with nonspecific ST abnormality. Due to above abnormality, she is referred to cardiology for further evaluation today.  EKG today with possible LVH and nonspecific ST abnormality.   Denies chest pain/tightness, dyspnea, palpitations, lightheadedness or syncope.     Fhx:  Diabetes (mother, father, sister)  Shx:  Never smoker    EKG 12/13/2024: Normal sinus rhythm, left axis deviation, nonspecific ST abnormality  05/14/2024: Normal sinus rhythm, anterior infarct age undetermined  11/09/2023:  Normal sinus rhythm, LVH by voltage criteria, nonspecific ST abnormality    ECHO No results found for this or any previous visit.     CATH: No results found for this or any previous visit.     Lab Results   Component Value Date     09/29/2023    K 4.7 09/29/2023     09/29/2023    CO2 32 09/29/2023    BUN 22 (H) 09/29/2023    CREATININE 0.87 09/29/2023    CALCIUM 9.2 09/29/2023    ANIONGAP 11 09/29/2023       No results found for: "CHOL"  No results found for: "HDL"  No results found for: "LDLCALC"  No results found for: "TRIG"  No results found for: "CHOLHDL"    No results found for: "WBC", "HGB", "HCT", "MCV", "PLT"        Current Outpatient Medications:     aspirin 325 MG tablet, Take 325 mg by mouth once daily., Disp: , Rfl:     cetirizine (ZYRTEC) 10 " MG tablet, Take 10 mg by mouth once daily., Disp: , Rfl:     fluticasone propionate (FLONASE) 50 mcg/actuation nasal spray, 2 sprays (100 mcg total) by Each Nostril route 2 (two) times a day., Disp: 18.2 mL, Rfl: 0    folic acid (FOLVITE) 1 MG tablet, Take 1 mg by mouth once daily., Disp: , Rfl:     losartan (COZAAR) 25 MG tablet, Take 1 tablet (25 mg total) by mouth once daily., Disp: 90 tablet, Rfl: 3    multivit,stress formula-zinc (STRESS FORMULA WITH ZINC) Tab, Take by mouth., Disp: , Rfl:     mupirocin (BACTROBAN) 2 % ointment, Apply topically 3 (three) times daily., Disp: 15 g, Rfl: 5    omega-3 fatty acids/fish oil (FISH OIL-OMEGA-3 FATTY ACIDS) 300-1,000 mg capsule, Take 1 capsule by mouth once daily., Disp: , Rfl:     pyridoxine, vitamin B6, (VITAMIN B-6) 100 MG Tab, Take 50 mg by mouth once daily., Disp: , Rfl:     tretinoin (RETIN-A) 0.05 % cream, Apply topically every evening., Disp: 20 g, Rfl: 5    valACYclovir (VALTREX) 1000 MG tablet, Take 1,000 mg by mouth once daily., Disp: , Rfl:     vitamin E 400 UNIT capsule, Take 400 Units by mouth once daily., Disp: , Rfl:     zinc gluconate 50 mg tablet, Take 50 mg by mouth once daily., Disp: , Rfl:     Review of Systems   Constitutional:  Negative for chills, diaphoresis, fever and malaise/fatigue.   Respiratory:  Positive for shortness of breath. Negative for cough.         Occasional    Cardiovascular:  Positive for palpitations. Negative for chest pain, orthopnea, claudication, leg swelling and PND.        Occasional   Gastrointestinal:  Negative for abdominal pain, heartburn, nausea and vomiting.   Neurological:  Negative for dizziness.       Objective:   There were no vitals taken for this visit.    Physical Exam  Constitutional:       General: She is not in acute distress.     Appearance: Normal appearance.   Cardiovascular:      Rate and Rhythm: Normal rate and regular rhythm.      Pulses: Normal pulses.      Heart sounds: Murmur heard.      No  friction rub. No gallop.   Pulmonary:      Effort: Pulmonary effort is normal.      Breath sounds: Normal breath sounds. No wheezing or rales.   Musculoskeletal:      Right lower leg: No edema.      Left lower leg: No edema.   Skin:     General: Skin is warm and dry.   Neurological:      Mental Status: She is alert.           Assessment:     1. Hypertension, unspecified type              Plan:   No problem-specific Assessment & Plan notes found for this encounter.    Hypertension  Blood pressure at goal today 130/72  - Continue losartan 25 mg daily    Undiagnosed cardiac murmur  - reports longstanding history of murmur.  No echo noted on file  - schedule echo    Follow-up in 1 year or sooner if echo is abnormal

## 2024-12-17 ENCOUNTER — PATIENT MESSAGE (OUTPATIENT)
Dept: ADMINISTRATIVE | Facility: HOSPITAL | Age: 64
End: 2024-12-17

## 2024-12-18 DIAGNOSIS — Z12.11 SCREENING FOR COLON CANCER: ICD-10-CM

## 2025-01-09 ENCOUNTER — HOSPITAL ENCOUNTER (OUTPATIENT)
Dept: CARDIOLOGY | Facility: HOSPITAL | Age: 65
Discharge: HOME | End: 2025-01-09
Attending: STUDENT IN AN ORGANIZED HEALTH CARE EDUCATION/TRAINING PROGRAM
Payer: MEDICARE

## 2025-01-09 DIAGNOSIS — R01.1 UNDIAGNOSED CARDIAC MURMURS: ICD-10-CM

## 2025-01-09 PROCEDURE — 93306 TTE W/DOPPLER COMPLETE: CPT

## 2025-01-09 PROCEDURE — 93306 TTE W/DOPPLER COMPLETE: CPT | Mod: 26,,, | Performed by: STUDENT IN AN ORGANIZED HEALTH CARE EDUCATION/TRAINING PROGRAM

## 2025-01-10 LAB
AORTIC VALVE CUSP SEPERATION: 1.67 CM
APICAL FOUR CHAMBER EJECTION FRACTION: 60 %
APICAL TWO CHAMBER EJECTION FRACTION: 49 %
ASCENDING AORTA: 3 CM
AV INDEX (PROSTH): 0.99
AV MEAN GRADIENT: 6.2 MMHG
AV PEAK GRADIENT: 10.2 MMHG
AV REGURGITATION PRESSURE HALF TIME: 518 MS
AV VALVE AREA BY VELOCITY RATIO: 2.7 CM²
AV VALVE AREA: 3.1 CM²
AV VELOCITY RATIO: 0.88
CV ECHO LV RWT: 0.58 CM
DOP CALC AO PEAK VEL: 1.6 M/S
DOP CALC AO VTI: 36.8 CM
DOP CALC LVOT AREA: 3.1 CM2
DOP CALC LVOT DIAMETER: 2 CM
DOP CALC LVOT PEAK VEL: 1.4 M/S
DOP CALC LVOT STROKE VOLUME: 114.6 CM3
DOP CALC RVOT PEAK VEL: 0.6 M/S
DOP CALC RVOT VTI: 13.3 CM
DOP CALCLVOT PEAK VEL VTI: 36.5 CM
E WAVE DECELERATION TIME: 236.1 MSEC
E/A RATIO: 0.9
E/E' RATIO: 7.58 M/S
ECHO LV POSTERIOR WALL: 1.1 CM (ref 0.6–1.1)
FRACTIONAL SHORTENING: 44.7 % (ref 28–44)
INTERVENTRICULAR SEPTUM: 1.1 CM (ref 0.6–1.1)
IVC DIAMETER: 1.22 CM
IVRT: 83.73 MSEC
LEFT ATRIUM AREA SYSTOLIC (APICAL 2 CHAMBER): 21.49 CM2
LEFT ATRIUM AREA SYSTOLIC (APICAL 4 CHAMBER): 21.55 CM2
LEFT ATRIUM SIZE: 3.18 CM
LEFT ATRIUM VOLUME MOD: 59.98 ML
LEFT INTERNAL DIMENSION IN SYSTOLE: 2.1 CM (ref 2.1–4)
LEFT VENTRICLE DIASTOLIC VOLUME: 61.83 ML
LEFT VENTRICLE END DIASTOLIC VOLUME APICAL 2 CHAMBER: 54.06 ML
LEFT VENTRICLE END DIASTOLIC VOLUME APICAL 4 CHAMBER: 67.64 ML
LEFT VENTRICLE END SYSTOLIC VOLUME APICAL 2 CHAMBER: 62.38 ML
LEFT VENTRICLE END SYSTOLIC VOLUME APICAL 4 CHAMBER: 56.33 ML
LEFT VENTRICLE SYSTOLIC VOLUME: 15.13 ML
LEFT VENTRICULAR INTERNAL DIMENSION IN DIASTOLE: 3.8 CM (ref 3.5–6)
LEFT VENTRICULAR MASS: 134.7 G
LV LATERAL E/E' RATIO: 8 M/S
LV SEPTAL E/E' RATIO: 7.2 M/S
LVED V (TEICH): 61.83 ML
LVES V (TEICH): 15.13 ML
LVOT MG: 5.27 MMHG
LVOT MV: 1.09 CM/S
MV PEAK A VEL: 0.8 M/S
MV PEAK E VEL: 0.72 M/S
MV STENOSIS PRESSURE HALF TIME: 68.47 MS
MV VALVE AREA P 1/2 METHOD: 3.21 CM2
OHS CV RV/LV RATIO: 0.87 CM
PISA AR MAX VEL: 4.28 M/S
PISA MRMAX VEL: 5.58 M/S
PISA TR MAX VEL: 2.53 M/S
PV MEAN GRADIENT: 1 MMHG
PV MV: 0.69 M/S
PV PEAK GRADIENT: 4 MMHG
PV PEAK VELOCITY: 1.03 M/S
RA PRESSURE ESTIMATED: 3 MMHG
RA VOL SYS: 43.42 ML
RIGHT ATRIAL AREA: 17 CM2
RIGHT ATRIUM VOLUME AREA LENGTH APICAL 4 CHAMBER: 42.34 ML
RIGHT VENTRICLE DIASTOLIC BASEL DIMENSION: 3.3 CM
RIGHT VENTRICLE DIASTOLIC LENGTH: 6.4 CM
RIGHT VENTRICLE DIASTOLIC MID DIMENSION: 2.9 CM
RIGHT VENTRICULAR END-DIASTOLIC DIMENSION: 3.31 CM
RIGHT VENTRICULAR LENGTH IN DIASTOLE (APICAL 4-CHAMBER VIEW): 6.44 CM
RV MID DIAMA: 2.89 CM
RV TB RVSP: 6 MMHG
SINUS: 2.4 CM
TDI LATERAL: 0.09 M/S
TDI SEPTAL: 0.1 M/S
TDI: 0.1 M/S
TR MAX PG: 26 MMHG
TRICUSPID ANNULAR PLANE SYSTOLIC EXCURSION: 2.42 CM
TV REST PULMONARY ARTERY PRESSURE: 29 MMHG

## 2025-01-10 NOTE — PROGRESS NOTES
Please inform shows normal heart function. Mild-to-moderate leakiness of aortic valve. That explains her murmur but is not a concerning finding. Plan to repeat echo in 2 years to keep an eye on the aortic regurgitation. Thanks.

## 2025-01-18 ENCOUNTER — PATIENT MESSAGE (OUTPATIENT)
Dept: ADMINISTRATIVE | Facility: HOSPITAL | Age: 65
End: 2025-01-18

## 2025-01-24 RX ORDER — LOSARTAN POTASSIUM 25 MG/1
25 TABLET ORAL DAILY
Qty: 90 TABLET | Refills: 3 | Status: SHIPPED | OUTPATIENT
Start: 2025-01-24

## 2025-01-24 NOTE — TELEPHONE ENCOUNTER
Patient stated that her prescription had been moved about three times from pharmacys.  She is needing a new Rx for Losartan 25mg to [facility] in [place].     ----- Message from Amara sent at 1/24/2025  9:03 AM CST -----  Who Called: Dorie Rhoades    Caller is requesting assistance/information from provider's office.    Pt is requesting to speak with a nurse about a medication. losartan (COZAAR) 25 MG tablet states the pharmacy has the prescription as 50 MG instead of 25 and wanted to know if that is correct or not.         Preferred Method of Contact: Phone Call  Patient's Preferred Phone Number on File: [phone]   Best Call Back Number, if different:  Additional Information:

## 2025-02-22 ENCOUNTER — RESULTS FOLLOW-UP (OUTPATIENT)
Dept: FAMILY MEDICINE | Facility: CLINIC | Age: 65
End: 2025-02-22

## 2025-08-04 ENCOUNTER — OFFICE VISIT (OUTPATIENT)
Dept: DERMATOLOGY | Facility: CLINIC | Age: 65
End: 2025-08-04
Payer: MEDICARE

## 2025-08-04 DIAGNOSIS — D48.5 NEOPLASM OF UNCERTAIN BEHAVIOR OF SKIN: ICD-10-CM

## 2025-08-04 DIAGNOSIS — D22.9 ATYPICAL NEVUS: ICD-10-CM

## 2025-08-04 DIAGNOSIS — Z86.018 HISTORY OF DYSPLASTIC NEVUS: ICD-10-CM

## 2025-08-04 DIAGNOSIS — D22.9 MULTIPLE BENIGN NEVI: ICD-10-CM

## 2025-08-04 DIAGNOSIS — L57.8 OTHER SKIN CHANGES DUE TO CHRONIC EXPOSURE TO NONIONIZING RADIATION: Primary | ICD-10-CM

## 2025-08-04 PROCEDURE — 99203 OFFICE O/P NEW LOW 30 MIN: CPT | Mod: 25,,, | Performed by: DERMATOLOGY

## 2025-08-04 PROCEDURE — 11102 TANGNTL BX SKIN SINGLE LES: CPT | Mod: ,,, | Performed by: DERMATOLOGY

## 2025-08-04 NOTE — PROGRESS NOTES
[facility]   Mitzi Lei MD    Patient Name: Dorie Rhoades  Patient YOB: 1960   Date of Service: 25    CC: Full Skin Exam    HPI: Dorie Rhoades is a 64 y.o. female presents today for a full skin exam.  Patient has not been seen by a dermatologist in the past and dermatologic history includes no hx of skin cancer. Patient is concerned today about a lesion located on the chest.  It has been present for 1 month(s). It has not been treated in the past.      History reviewed. No pertinent past medical history.  Past Surgical History:   Procedure Laterality Date     SECTION      UTERINE FIBROID SURGERY       Review of patient's allergies indicates:  No Known Allergies  Current Medications[1]    ROS: A focused review of systems was obtained and negative.     Exam: A full skin exam was performed including scalp, hair, face, neck, chest, back, abdomen, right arm, left arm, right hand, left hand, nails, right leg, and left leg.  All areas examined were normal expect as per below in assessment and plan.  General Appearance of the patient is well developed and well nourished.  Orientation: alert and oriented x 3.  Mood and affect: pleasant.    Assessment:   The primary encounter diagnosis was Other skin changes due to chronic exposure to nonionizing radiation. Diagnoses of Atypical nevus, Neoplasm of uncertain behavior of skin, Multiple benign nevi, and History of dysplastic nevus were also pertinent to this visit.    Plan:   Other Skin Changes Due to Chronic Exposure of Nonionizing Radiation (L57.8)    Plan: Monitoring.     Plan: Sunscreen Recommendations.  I recommended a broad spectrum sunscreen with a SPF of 30 or higher. I explained that SPF 30 sunscreens block approximately 97 percent of the  sun's harmful rays. Sunscreens should be applied at least 15 minutes prior to expected sun exposure and then every 2 hours after that as long as  sun exposure continues. If swimming or  exercising sunscreen should be reapplied every 45 minutes to an hour after getting wet or sweating. One  ounce, or the equivalent of a shot glass full of sunscreen, is adequate to protect the skin not covered by a bathing suit. I also recommended a lip  balm with a sunscreen as well. Sun protective clothing can be used in lieu of sunscreen but must be worn the entire time you are exposed to the  sun's rays.    History of Dysplastic Nevi  - well healed scar with NER    Plan: Counseling  I counseled the patient regarding the following:  Education: The importance of regular self skin checks to monitor for any changes in mole size, shape or color was reviewed with the patient.  Expectations: Atypical Nevi need to be monitored on a regular basis by patients and dermatologists for changes since they have the potential to turn into melanoma.  Contact office if: Any moles change in size, shape or color, or itch, burn or bleed.    Neoplasm of Uncertain Behavior (D48.5)  - dark macule located on the left mid back  Ddx includes: nevus vs DN r/o melanoma     Plan: Counseling.  I counseled the patient regarding the following:  Instructions: Neoplasms of Uncertain Behavior can be observed, biopsied or surgically removed depending on the  level of clinical suspicion.  Instructions: Neoplasms of Uncertain Behavior can be observed, biopsied or surgically removed depending on the  level of clinical suspicion.  Contact Office if: patient develops any new lesions that fail to heal, ulcerate or bleed.    Plan: Biopsy by Shave Method.  Location (1): left mid back  Written consent was obtained and risks were reviewed including but not  limited to scarring, infection, bleeding, scabbing, incomplete removal, nerve damage and allergy to anesthesia.  The area was prepped with Chloraprep. Local anesthesia was obtained with approximately 0.5cc of 1% lidocaine  with epinephrine. A biopsy by shave method to the level of the dermis (sent for H and  E) was performed using  a Dermablade on the above location. Aluminum chloride was used for hemostasis. Following the biopsy  Petrolatum and a bandage were applied. Patient will be notified of biopsy results. However, patient instructed to  call the office if not contacted within 2 weeks.    Benign Nevus (D22.72)  - Dome shaped regular papule    Plan: Reassurance.    Plan: Counseling.  I counseled the patient regarding the following:  Instructions: Monthly self-skin checks to monitor for any changes in moles are recommended.  Expectations: Benign Nevi are pigmented nests of cells within the skin. No treatment is necessary.  Contact Office if: Any moles change in size, shape or color; itch, burn or bleed.           Follow up in about 1 year (around 8/4/2026).    Mitzi Lei MD           [1]   Current Outpatient Medications:     aspirin 325 MG tablet, Take 325 mg by mouth once daily., Disp: , Rfl:     cetirizine (ZYRTEC) 10 MG tablet, Take 10 mg by mouth once daily., Disp: , Rfl:     fluticasone propionate (FLONASE) 50 mcg/actuation nasal spray, 2 sprays (100 mcg total) by Each Nostril route 2 (two) times a day., Disp: 18.2 mL, Rfl: 0    folic acid (FOLVITE) 1 MG tablet, Take 1 mg by mouth once daily., Disp: , Rfl:     losartan (COZAAR) 25 MG tablet, Take 1 tablet (25 mg total) by mouth once daily., Disp: 90 tablet, Rfl: 3    multivit,stress formula-zinc (STRESS FORMULA WITH ZINC) Tab, Take by mouth., Disp: , Rfl:     mupirocin (BACTROBAN) 2 % ointment, Apply topically 3 (three) times daily., Disp: 15 g, Rfl: 5    omega-3 fatty acids/fish oil (FISH OIL-OMEGA-3 FATTY ACIDS) 300-1,000 mg capsule, Take 1 capsule by mouth once daily., Disp: , Rfl:     pyridoxine, vitamin B6, (VITAMIN B-6) 100 MG Tab, Take 50 mg by mouth once daily., Disp: , Rfl:     tretinoin (RETIN-A) 0.05 % cream, Apply topically every evening. (Patient not taking: Reported on 12/13/2024), Disp: 20 g, Rfl: 5    valACYclovir (VALTREX) 1000 MG tablet, Take  1,000 mg by mouth once daily., Disp: , Rfl:     vitamin E 400 UNIT capsule, Take 400 Units by mouth once daily., Disp: , Rfl:     zinc gluconate 50 mg tablet, Take 50 mg by mouth once daily., Disp: , Rfl:

## 2025-08-20 ENCOUNTER — OFFICE VISIT (OUTPATIENT)
Dept: FAMILY MEDICINE | Facility: CLINIC | Age: 65
End: 2025-08-20
Payer: MEDICARE

## 2025-08-20 VITALS
OXYGEN SATURATION: 97 % | HEIGHT: 66 IN | SYSTOLIC BLOOD PRESSURE: 128 MMHG | WEIGHT: 173.19 LBS | RESPIRATION RATE: 16 BRPM | BODY MASS INDEX: 27.83 KG/M2 | TEMPERATURE: 98 F | HEART RATE: 67 BPM | DIASTOLIC BLOOD PRESSURE: 70 MMHG

## 2025-08-20 DIAGNOSIS — Z11.4 SCREENING FOR HIV (HUMAN IMMUNODEFICIENCY VIRUS): ICD-10-CM

## 2025-08-20 DIAGNOSIS — J01.80 ACUTE NON-RECURRENT SINUSITIS OF OTHER SINUS: ICD-10-CM

## 2025-08-20 DIAGNOSIS — Z78.0 POST-MENOPAUSAL: ICD-10-CM

## 2025-08-20 DIAGNOSIS — Z11.59 NEED FOR HEPATITIS C SCREENING TEST: ICD-10-CM

## 2025-08-20 DIAGNOSIS — I10 ESSENTIAL HYPERTENSION, BENIGN: Primary | ICD-10-CM

## 2025-08-20 DIAGNOSIS — Z13.1 SCREENING FOR DIABETES MELLITUS: ICD-10-CM

## 2025-08-20 LAB
ALBUMIN SERPL BCP-MCNC: 3.6 G/DL (ref 3.4–4.8)
ALBUMIN/GLOB SERPL: 1 {RATIO}
ALP SERPL-CCNC: 88 U/L (ref 40–150)
ALT SERPL W P-5'-P-CCNC: 28 U/L
ANION GAP SERPL CALCULATED.3IONS-SCNC: 10 MMOL/L (ref 7–16)
AST SERPL W P-5'-P-CCNC: 29 U/L (ref 11–45)
BASOPHILS # BLD AUTO: 0.05 K/UL (ref 0–0.2)
BASOPHILS NFR BLD AUTO: 1 % (ref 0–1)
BILIRUB SERPL-MCNC: 0.4 MG/DL
BUN SERPL-MCNC: 20 MG/DL (ref 10–20)
BUN/CREAT SERPL: 23 (ref 6–20)
CALCIUM SERPL-MCNC: 9.1 MG/DL (ref 8.4–10.2)
CHLORIDE SERPL-SCNC: 103 MMOL/L (ref 98–107)
CHOLEST SERPL-MCNC: 192 MG/DL
CHOLEST/HDLC SERPL: 4.2 {RATIO}
CO2 SERPL-SCNC: 29 MMOL/L (ref 23–31)
CREAT SERPL-MCNC: 0.88 MG/DL (ref 0.55–1.02)
DIFFERENTIAL METHOD BLD: ABNORMAL
EGFR (NO RACE VARIABLE) (RUSH/TITUS): 73 ML/MIN/1.73M2
EOSINOPHIL # BLD AUTO: 0.21 K/UL (ref 0–0.5)
EOSINOPHIL NFR BLD AUTO: 4.3 % (ref 1–4)
ERYTHROCYTE [DISTWIDTH] IN BLOOD BY AUTOMATED COUNT: 13.7 % (ref 11.5–14.5)
EST. AVERAGE GLUCOSE BLD GHB EST-MCNC: 105 MG/DL
GLOBULIN SER-MCNC: 3.6 G/DL (ref 2–4)
GLUCOSE SERPL-MCNC: 80 MG/DL (ref 82–115)
HBA1C MFR BLD HPLC: 5.3 %
HCT VFR BLD AUTO: 49.7 % (ref 38–47)
HCV AB SER QL: NORMAL
HDLC SERPL-MCNC: 46 MG/DL (ref 35–60)
HGB BLD-MCNC: 16 G/DL (ref 12–16)
HIV 1+O+2 AB SERPL QL: NORMAL
IMM GRANULOCYTES # BLD AUTO: 0.01 K/UL (ref 0–0.04)
IMM GRANULOCYTES NFR BLD: 0.2 % (ref 0–0.4)
LDLC SERPL CALC-MCNC: 116 MG/DL
LDLC/HDLC SERPL: 2.5 {RATIO}
LYMPHOCYTES # BLD AUTO: 1.47 K/UL (ref 1–4.8)
LYMPHOCYTES NFR BLD AUTO: 29.8 % (ref 27–41)
MCH RBC QN AUTO: 29.5 PG (ref 27–31)
MCHC RBC AUTO-ENTMCNC: 32.2 G/DL (ref 32–36)
MCV RBC AUTO: 91.5 FL (ref 80–96)
MONOCYTES # BLD AUTO: 0.47 K/UL (ref 0–0.8)
MONOCYTES NFR BLD AUTO: 9.5 % (ref 2–6)
MPC BLD CALC-MCNC: 10.6 FL (ref 9.4–12.4)
NEUTROPHILS # BLD AUTO: 2.73 K/UL (ref 1.8–7.7)
NEUTROPHILS NFR BLD AUTO: 55.2 % (ref 53–65)
NONHDLC SERPL-MCNC: 146 MG/DL
NRBC # BLD AUTO: 0 X10E3/UL
NRBC, AUTO (.00): 0 %
PLATELET # BLD AUTO: 254 K/UL (ref 150–400)
POTASSIUM SERPL-SCNC: 4.3 MMOL/L (ref 3.5–5.1)
PROT SERPL-MCNC: 7.2 G/DL (ref 5.8–7.6)
RBC # BLD AUTO: 5.43 M/UL (ref 4.2–5.4)
SODIUM SERPL-SCNC: 138 MMOL/L (ref 136–145)
TRIGL SERPL-MCNC: 151 MG/DL (ref 37–140)
TSH SERPL DL<=0.005 MIU/L-ACNC: 1.94 UIU/ML (ref 0.35–4.94)
VLDLC SERPL-MCNC: 30 MG/DL
WBC # BLD AUTO: 4.94 K/UL (ref 4.5–11)

## 2025-08-20 PROCEDURE — 99214 OFFICE O/P EST MOD 30 MIN: CPT | Mod: ,,, | Performed by: NURSE PRACTITIONER

## 2025-08-20 RX ORDER — AMOXICILLIN AND CLAVULANATE POTASSIUM 875; 125 MG/1; MG/1
1 TABLET, FILM COATED ORAL 2 TIMES DAILY
Qty: 20 TABLET | Refills: 0 | Status: SHIPPED | OUTPATIENT
Start: 2025-08-20 | End: 2025-08-20

## 2025-08-20 RX ORDER — BENZONATATE 200 MG/1
200 CAPSULE ORAL 3 TIMES DAILY PRN
Qty: 30 CAPSULE | Refills: 0 | Status: SHIPPED | OUTPATIENT
Start: 2025-08-20 | End: 2025-08-30

## 2025-08-20 RX ORDER — AZITHROMYCIN 250 MG/1
TABLET, FILM COATED ORAL
Qty: 6 TABLET | Refills: 0 | Status: SHIPPED | OUTPATIENT
Start: 2025-08-20

## 2025-08-21 ENCOUNTER — PATIENT MESSAGE (OUTPATIENT)
Dept: FAMILY MEDICINE | Facility: CLINIC | Age: 65
End: 2025-08-21
Payer: MEDICARE